# Patient Record
Sex: FEMALE | Race: WHITE | NOT HISPANIC OR LATINO | Employment: FULL TIME | URBAN - METROPOLITAN AREA
[De-identification: names, ages, dates, MRNs, and addresses within clinical notes are randomized per-mention and may not be internally consistent; named-entity substitution may affect disease eponyms.]

---

## 2020-05-26 ENCOUNTER — TELEPHONE (OUTPATIENT)
Dept: OBSTETRICS AND GYNECOLOGY | Facility: CLINIC | Age: 45
End: 2020-05-26

## 2020-05-27 ENCOUNTER — TELEPHONE (OUTPATIENT)
Dept: UROLOGY | Facility: CLINIC | Age: 45
End: 2020-05-27

## 2020-05-27 NOTE — TELEPHONE ENCOUNTER
----- Message from Dipesh Mccullough sent at 5/27/2020  9:51 AM CDT -----  Contact: pt  Type:  Needs Medical Advice    Who Called: pt  Symptoms (please be specific): left pulvis pain    How long has patient had these symptoms:  n/a  Pharmacy name and phone # n/a  Would the patient rather- a call back or a response via [facility]? Call back  Best Call Back Number [phone]  Additional Information: Caller is calling in regards to Pulvis pain on her left side// pt also ststes she just wanted to talk to someone to see what they suggest //

## 2020-06-19 ENCOUNTER — OFFICE VISIT (OUTPATIENT)
Dept: OBSTETRICS AND GYNECOLOGY | Facility: CLINIC | Age: 45
End: 2020-06-19
Payer: COMMERCIAL

## 2020-06-19 VITALS
SYSTOLIC BLOOD PRESSURE: 159 MMHG | DIASTOLIC BLOOD PRESSURE: 99 MMHG | HEIGHT: 63 IN | HEART RATE: 100 BPM | WEIGHT: 187.81 LBS | BODY MASS INDEX: 33.28 KG/M2

## 2020-06-19 DIAGNOSIS — N95.2 ATROPHIC VAGINITIS: ICD-10-CM

## 2020-06-19 DIAGNOSIS — Z01.419 WELL WOMAN EXAM WITH ROUTINE GYNECOLOGICAL EXAM: Primary | ICD-10-CM

## 2020-06-19 PROCEDURE — 99386 PREV VISIT NEW AGE 40-64: CPT | Mod: S$GLB,,, | Performed by: OBSTETRICS & GYNECOLOGY

## 2020-06-19 PROCEDURE — 99386 PR PREVENTIVE VISIT,NEW,40-64: ICD-10-PCS | Mod: S$GLB,,, | Performed by: OBSTETRICS & GYNECOLOGY

## 2020-06-19 RX ORDER — ESTRADIOL 0.1 MG/G
1 CREAM VAGINAL
Qty: 42.5 G | Refills: 3 | Status: SHIPPED | OUTPATIENT
Start: 2020-06-22 | End: 2021-06-22

## 2020-06-19 NOTE — PROGRESS NOTES
Sandra Sequeira is a 44 y.o.  who presents for a well woman exam.  She notes 3 weeks with intermittent pelvic pain more on the right. Better after treated for UTI by Dr. Olson. She has an appointment with Dr. Pabon for follow up GI evaluation on Monday. She had a negative CT at Urgent Care a few weeks ago to rule out appendicitis.     Past Medical History:   Diagnosis Date    Hyperlipidemia      Past Surgical History:   Procedure Laterality Date    HYSTERECTOMY      LAPAROSCOPIC SURGICAL REMOVAL OF CYST OF OVARY Right     TOTAL ABDOMINAL HYSTERECTOMY W/ BILATERAL SALPINGOOPHORECTOMY       OB History    Para Term  AB Living   1 1           SAB TAB Ectopic Multiple Live Births                  # Outcome Date GA Lbr Jorge/2nd Weight Sex Delivery Anes PTL Lv   1 Para               Family History   Problem Relation Age of Onset    Diabetes Father     Diabetes Mother     Diabetes Brother      Social History     Tobacco Use    Smoking status: Never Smoker    Smokeless tobacco: Never Used   Substance Use Topics    Alcohol use: Never     Frequency: Never    Drug use: Never       Current Outpatient Medications:     [START ON 2020] estradioL (ESTRACE) 0.01 % (0.1 mg/gram) vaginal cream, Place 1 g vaginally twice a week., Disp: 42.5 g, Rfl: 3   Review of patient's allergies indicates:   Allergen Reactions    Levaquin [levofloxacin]     Zithromax [azithromycin]         ROS:  GENERAL: Denies weight gain or weight loss. Feeling well overall.   SKIN: Denies rash or lesions.   HEAD: Denies head injury or headache.   NODES: Denies enlarged lymph nodes.   CHEST: Denies shortness of breath.   CARDIOVASCULAR: Denies abdominal pain, constipation, diarrhea, nausea, vomiting or rectal bleeding.   URINARY: Denies frequency, dysuria, hematuria, or burning on urination.  REPRODUCTIVE: See HPI.   BREASTS: Denies pain, lumps, or nipple discharge.   HEMATOLOGIC: Denies easy bruisability or excessive  "bleeding.   MUSCULOSKELETAL: Denies joint pain or swelling.   NEUROLOGIC: Denies syncope or weakness.   PSYCHIATRIC: Denies depression, anxiety or mood swings.    PHYSICAL EXAM:    BP (!) 159/99   Pulse 100   Ht 5' 3" (1.6 m)   Wt 85.2 kg (187 lb 12.8 oz)   LMP  (LMP Unknown)   BMI 33.27 kg/m²    Body mass index is 33.27 kg/m².     APPEARANCE: Well nourished, well developed, in no acute distress.  AFFECT: WNL, alert and oriented x 3  SKIN: No acne or hirsutism  NECK: Neck symmetric without masses or thyromegaly  NODES: No inguinal, cervical, axillary, or femoral lymph node enlargement  CHEST: Good respiratory effect  ABDOMEN: Soft.  No tenderness or masses.  No hepatosplenomegaly.  No hernias.  BREASTS: Symmetrical, no skin changes or visible lesions.  No palpable masses, nipple discharge bilaterally.  PELVIC: Normal external genitalia without lesions.  Normal hair distribution.  Adequate perineal body, normal urethral meatus.  Urethra and bladder without tenderness or masses. Vagina with atrophic changes but without lesions or discharge.  No significant cystocele or rectocele.  Bimanual exam shows adnexa without masses or tenderness.    EXTREMITIES: No edema.     Well woman exam with routine gynecological exam  -     Liquid-based pap smear, screening  -     CBC auto differential; Future; Expected date: 06/26/2020  -     Comprehensive metabolic panel; Future; Expected date: 06/26/2020  -     Lipid Panel; Future; Expected date: 06/26/2020  -     TSH; Future; Expected date: 06/26/2020  -     T4, free; Future; Expected date: 06/26/2020    Atrophic vaginitis  -     estradioL (ESTRACE) 0.01 % (0.1 mg/gram) vaginal cream; Place 1 g vaginally twice a week.  Dispense: 42.5 g; Refill: 3             Patient was counseled today on A.C.S. Pap guidelines and recommendations for yearly pelvic exams, mammograms and monthly self breast exams; to see her PCP for other health maintenance.   Mammogram order given  Follow up in 1 " year

## 2020-06-20 LAB
ABS NRBC COUNT: 0 X 10 3/UL (ref 0–0.01)
ABSOLUTE BASOPHIL: 0.04 X 10 3/UL (ref 0–0.22)
ABSOLUTE EOSINOPHIL: 0.07 X 10 3/UL (ref 0.04–0.54)
ABSOLUTE IMMATURE GRAN: 0.02 X 10 3/UL (ref 0–0.04)
ABSOLUTE LYMPHOCYTE: 2.55 X 10 3/UL (ref 0.86–4.75)
ABSOLUTE MONOCYTE: 0.59 X 10 3/UL (ref 0.22–1.08)
ALBUMIN SERPL-MCNC: 4.3 G/DL (ref 3.5–5.2)
ALBUMIN/GLOB SERPL ELPH: 1.4 {RATIO} (ref 1–2.7)
ALP ISOS SERPL LEV INH-CCNC: 128 U/L (ref 35–105)
ALT (SGPT): 30 U/L (ref 0–33)
ANION GAP SERPL CALC-SCNC: 14 MMOL/L (ref 8–17)
AST SERPL-CCNC: 19 U/L (ref 0–32)
BASOPHILS NFR BLD: 0.5 % (ref 0.2–1.2)
BILIRUBIN, TOTAL: 0.17 MG/DL (ref 0–1.2)
BUN/CREAT SERPL: 25.7 (ref 6–20)
CALCIUM SERPL-MCNC: 9.6 MG/DL (ref 8.6–10.2)
CARBON DIOXIDE, CO2: 24 MMOL/L (ref 22–29)
CHLORIDE: 105 MMOL/L (ref 98–107)
CHOLEST SERPL-MSCNC: 236 MG/DL (ref 100–200)
CREAT SERPL-MCNC: 0.6 MG/DL (ref 0.5–0.9)
EOSINOPHIL NFR BLD: 0.9 % (ref 0.7–7)
GFR ESTIMATION: 109.11
GLOBULIN: 3 G/DL (ref 1.5–4.5)
GLUCOSE: 115 MG/DL (ref 74–106)
HCT VFR BLD AUTO: 36.3 % (ref 37–47)
HDLC SERPL-MCNC: 46 MG/DL
HGB BLD-MCNC: 10.6 G/DL (ref 12–16)
IMMATURE GRANULOCYTES: 0.3 % (ref 0–0.5)
LDL/HDL RATIO: 3 (ref 1–3)
LDLC SERPL CALC-MCNC: 139.8 MG/DL (ref 0–100)
LYMPHOCYTES NFR BLD: 32 % (ref 19.3–53.1)
MCH RBC QN AUTO: 22.8 PG (ref 27–32)
MCHC RBC AUTO-ENTMCNC: 29.2 G/DL (ref 32–36)
MCV RBC AUTO: 78.2 FL (ref 82–100)
MONOCYTES NFR BLD: 7.4 % (ref 4.7–12.5)
NEUTROPHILS ABSOLUTE COUNT: 4.7 X 10 3/UL (ref 2.15–7.56)
NEUTROPHILS NFR BLD: 58.9 %
NUCLEATED RED BLOOD CELLS: 0 /100 WBC (ref 0–0.2)
PLATELET # BLD AUTO: 385 X 10 3/UL (ref 135–400)
POTASSIUM: 4 MMOL/L (ref 3.5–5.1)
PROT SNV-MCNC: 7.3 G/DL (ref 6.4–8.3)
RBC # BLD AUTO: 4.64 X 10 6/UL (ref 4.2–5.4)
RDW-SD: 46.5 FL (ref 37–54)
SODIUM: 143 MMOL/L (ref 136–145)
T4, FREE: 1.25 NG/DL (ref 0.93–1.7)
TRIGL SERPL-MCNC: 251 MG/DL (ref 0–150)
TSH SERPL DL<=0.005 MIU/L-ACNC: 2.46 UIU/ML (ref 0.27–4.2)
UREA NITROGEN (BUN): 15.4 MG/DL (ref 6–20)
WBC # BLD: 7.97 X 10 3/UL (ref 4.3–10.8)

## 2020-06-22 ENCOUNTER — TELEPHONE (OUTPATIENT)
Dept: OBSTETRICS AND GYNECOLOGY | Facility: CLINIC | Age: 45
End: 2020-06-22

## 2020-06-22 NOTE — TELEPHONE ENCOUNTER
----- Message from Ashlyn Thakkar MD sent at 6/21/2020  1:30 PM CDT -----  Please notify pt that labwork shows elevated glucose and cholesterol panel, although thyroid testing is normal. This is consistent with weight gain. Would recommend she increase her physical activity/daily exercise and dietary changes to limit sugar and carbohydate intake to decrease caloric intake and facilitate weight loss.

## 2020-08-11 ENCOUNTER — TELEPHONE (OUTPATIENT)
Dept: OBSTETRICS AND GYNECOLOGY | Facility: CLINIC | Age: 45
End: 2020-08-11

## 2020-08-11 DIAGNOSIS — R30.0 DYSURIA: Primary | ICD-10-CM

## 2020-08-11 RX ORDER — NITROFURANTOIN 25; 75 MG/1; MG/1
100 CAPSULE ORAL 2 TIMES DAILY
Qty: 14 CAPSULE | Refills: 0 | Status: SHIPPED | OUTPATIENT
Start: 2020-08-11 | End: 2020-08-18

## 2020-08-11 NOTE — TELEPHONE ENCOUNTER
Please have patient go do a urinalysis and a urine culture at the lab.  If no fever, severe pain and has symptoms consistent with a UTI, I can call out macrobid rx. If she gets any of the above, she needs to be seen

## 2020-08-11 NOTE — TELEPHONE ENCOUNTER
----- Message from Marlyn Lock sent at 8/11/2020 10:18 AM CDT -----  Regarding: pt  Type:  Patient Returning Call    Who Called:pt  Who Left Message for Patient:kym  Does the patient know what this is regarding?:  Would the patient rather a call back or a response via [facility]? Call back  Best Call Back Number:[phone]  Additional Information:

## 2020-08-11 NOTE — TELEPHONE ENCOUNTER
Pt instructed to go to [facility] on [facility] for UA/UC. Pt denies any fever or severe pain. Pt informed abx sent to her pharmacy.

## 2020-08-11 NOTE — TELEPHONE ENCOUNTER
----- Message from Abimbola Pruett sent at 8/11/2020 10:11 AM CDT -----  .Type:  Needs Medical Advice    Who Called:  Patient   Symptoms (please be specific):   possible yeast infection / UTI / back pain / side pain / burning   How long has patient had these symptoms:  1 week and a half   Pharmacy name and phone #:    [id number] - [place], [place] [address] [address] [address]  Phone: [phone] Fax: [phone]      Would the patient rather a call back or a response via [facility]? call  Best Call Back Number:  [phone]  Additional Information: n/a

## 2021-11-22 ENCOUNTER — OFFICE VISIT (OUTPATIENT)
Dept: OBSTETRICS AND GYNECOLOGY | Facility: CLINIC | Age: 46
End: 2021-11-22
Payer: COMMERCIAL

## 2021-11-22 VITALS
BODY MASS INDEX: 32.2 KG/M2 | DIASTOLIC BLOOD PRESSURE: 100 MMHG | WEIGHT: 181.81 LBS | SYSTOLIC BLOOD PRESSURE: 162 MMHG

## 2021-11-22 DIAGNOSIS — Z12.31 SCREENING MAMMOGRAM, ENCOUNTER FOR: ICD-10-CM

## 2021-11-22 DIAGNOSIS — Z01.419 WELL WOMAN EXAM WITH ROUTINE GYNECOLOGICAL EXAM: Primary | ICD-10-CM

## 2021-11-22 PROCEDURE — 99396 PR PREVENTIVE VISIT,EST,40-64: ICD-10-PCS | Mod: S$GLB,,, | Performed by: OBSTETRICS & GYNECOLOGY

## 2021-11-22 PROCEDURE — 99396 PREV VISIT EST AGE 40-64: CPT | Mod: S$GLB,,, | Performed by: OBSTETRICS & GYNECOLOGY

## 2021-11-22 RX ORDER — SIMVASTATIN 10 MG/1
10 TABLET, FILM COATED ORAL NIGHTLY
COMMUNITY

## 2022-11-10 ENCOUNTER — PATIENT MESSAGE (OUTPATIENT)
Dept: OBSTETRICS AND GYNECOLOGY | Facility: CLINIC | Age: 47
End: 2022-11-10
Payer: COMMERCIAL

## 2022-11-15 ENCOUNTER — OFFICE VISIT (OUTPATIENT)
Dept: OBSTETRICS AND GYNECOLOGY | Facility: CLINIC | Age: 47
End: 2022-11-15
Payer: COMMERCIAL

## 2022-11-15 VITALS
WEIGHT: 193.81 LBS | BODY MASS INDEX: 34.34 KG/M2 | DIASTOLIC BLOOD PRESSURE: 101 MMHG | SYSTOLIC BLOOD PRESSURE: 144 MMHG | HEIGHT: 63 IN

## 2022-11-15 DIAGNOSIS — N95.1 MENOPAUSAL SYMPTOMS: Primary | ICD-10-CM

## 2022-11-15 DIAGNOSIS — R63.5 WEIGHT GAIN: ICD-10-CM

## 2022-11-15 PROCEDURE — 3080F PR MOST RECENT DIASTOLIC BLOOD PRESSURE >= 90 MM HG: ICD-10-PCS | Mod: CPTII,S$GLB,, | Performed by: OBSTETRICS & GYNECOLOGY

## 2022-11-15 PROCEDURE — 3077F PR MOST RECENT SYSTOLIC BLOOD PRESSURE >= 140 MM HG: ICD-10-PCS | Mod: CPTII,S$GLB,, | Performed by: OBSTETRICS & GYNECOLOGY

## 2022-11-15 PROCEDURE — 3008F PR BODY MASS INDEX (BMI) DOCUMENTED: ICD-10-PCS | Mod: CPTII,S$GLB,, | Performed by: OBSTETRICS & GYNECOLOGY

## 2022-11-15 PROCEDURE — 99213 OFFICE O/P EST LOW 20 MIN: CPT | Mod: S$GLB,,, | Performed by: OBSTETRICS & GYNECOLOGY

## 2022-11-15 PROCEDURE — 1159F MED LIST DOCD IN RCRD: CPT | Mod: CPTII,S$GLB,, | Performed by: OBSTETRICS & GYNECOLOGY

## 2022-11-15 PROCEDURE — 99213 PR OFFICE/OUTPT VISIT, EST, LEVL III, 20-29 MIN: ICD-10-PCS | Mod: S$GLB,,, | Performed by: OBSTETRICS & GYNECOLOGY

## 2022-11-15 PROCEDURE — 1159F PR MEDICATION LIST DOCUMENTED IN MEDICAL RECORD: ICD-10-PCS | Mod: CPTII,S$GLB,, | Performed by: OBSTETRICS & GYNECOLOGY

## 2022-11-15 PROCEDURE — 3077F SYST BP >= 140 MM HG: CPT | Mod: CPTII,S$GLB,, | Performed by: OBSTETRICS & GYNECOLOGY

## 2022-11-15 PROCEDURE — 3008F BODY MASS INDEX DOCD: CPT | Mod: CPTII,S$GLB,, | Performed by: OBSTETRICS & GYNECOLOGY

## 2022-11-15 PROCEDURE — 3080F DIAST BP >= 90 MM HG: CPT | Mod: CPTII,S$GLB,, | Performed by: OBSTETRICS & GYNECOLOGY

## 2022-11-15 RX ORDER — NALTREXONE HYDROCHLORIDE AND BUPROPION HYDROCHLORIDE 8; 90 MG/1; MG/1
TABLET, EXTENDED RELEASE ORAL
Qty: 120 TABLET | Refills: 5 | Status: SHIPPED | OUTPATIENT
Start: 2022-11-15

## 2022-11-15 RX ORDER — TESTOSTERONE
POWDER (GRAM) MISCELLANEOUS
Qty: 30 G | Refills: 5 | Status: SHIPPED | OUTPATIENT
Start: 2022-11-15

## 2022-11-15 NOTE — PROGRESS NOTES
Chief Complaint: review hormone levels done with her PCP     HPI:      Sandra Sequeira is a 47 y.o. female  who presents complaining of inability to lose weight.  No LMP recorded (lmp unknown). Patient has had a hysterectomy. Having weight gain and mood swings. She is exercising and doing fasting type diet and has not lost any weight. Hormone levels checked by her PCP show low estrogen, progesterone and testosterone but she has concerns about getting on estrogen.     Past Medical History:   Diagnosis Date    History of UTI     Hyperlipidemia       Past Surgical History:   Procedure Laterality Date    LAPAROSCOPIC SURGICAL REMOVAL OF CYST OF OVARY Right     TOTAL ABDOMINAL HYSTERECTOMY W/ BILATERAL SALPINGOOPHORECTOMY  2006      Social History     Tobacco Use    Smoking status: Never    Smokeless tobacco: Never   Substance Use Topics    Alcohol use: Never    Drug use: Never      Current Outpatient Medications on File Prior to Visit   Medication Sig Dispense Refill    simvastatin (ZOCOR) 10 MG tablet Take 10 mg by mouth every evening.      estradioL (ESTRACE) 0.01 % (0.1 mg/gram) vaginal cream Place 1 g vaginally twice a week. 42.5 g 3     No current facility-administered medications on file prior to visit.      Review of patient's allergies indicates:   Allergen Reactions    Levaquin [levofloxacin]     Zithromax [azithromycin]           ROS:     GENERAL: Denies weight gain or weight loss. Feeling well overall.   SKIN: Denies rash or lesions.   NODES: Denies enlarged lymph nodes.   CARDIOVASCULAR: Denies palpitations or chest pain.   ABDOMEN: Denies abdominal pain, constipation, diarrhea, nausea, vomiting or rectal bleeding.   URINARY: Denies frequency, dysuria, hematuria, or burning on urination.  REPRODUCTIVE: See HPI.   BREASTS: Denies pain, lumps, or nipple discharge.   HEMATOLOGIC: Denies easy bruisability or excessive bleeding   PSYCHIATRIC: Denies depression, anxiety or mood swings.   Physical  "Exam:      BP (!) 144/101   Ht 5' 3" (1.6 m)   Wt 87.9 kg (193 lb 12.8 oz)   LMP  (LMP Unknown)   BMI 34.33 kg/m²   Body mass index is 34.33 kg/m².     Assessment/Plan:     Menopausal symptoms  -     testosterone, bulk, Powd; Apply 4 clicks 1 ml daily, please convert to compound  Dispense: 30 g; Refill: 5    Weight gain  -     naltrexone-bupropion (CONTRAVE) 8-90 mg TbSR; 1 po qAM x 7 days, then 1 po BID x 7 days, then 2 po q AM and 1 po q PM x 7 days, then 2 po BID  Dispense: 120 tablet; Refill: 5    She will follow up at her annual  " subxiphoid pericardial drain in place, dressing c/d/i, no drainage in collection bag at this time

## 2023-06-13 ENCOUNTER — TELEPHONE (OUTPATIENT)
Dept: OBSTETRICS AND GYNECOLOGY | Facility: CLINIC | Age: 48
End: 2023-06-13
Payer: COMMERCIAL

## 2023-06-13 NOTE — TELEPHONE ENCOUNTER
Called and spoke with patient. She is seeing her PCP today and is going to ask if he can refill her prescription this one time and then will see us on August 15th.   Shanti Cardona

## 2023-06-13 NOTE — TELEPHONE ENCOUNTER
----- Message from Judit Guthrie sent at 6/6/2023  9:04 AM CDT -----  Regarding: NP info  Contact: patient  PT was a former Dr Thakkar patient and she is wanting to switch to the provider, Dr Thakkar was prescribing her some hormone cream and she wants to know if the provider can do that for her, her pharmacist advised they can fax over the information to get it filled, return call [phone]